# Patient Record
Sex: MALE | Race: WHITE | NOT HISPANIC OR LATINO
[De-identification: names, ages, dates, MRNs, and addresses within clinical notes are randomized per-mention and may not be internally consistent; named-entity substitution may affect disease eponyms.]

---

## 2018-08-13 PROBLEM — Z00.00 ENCOUNTER FOR PREVENTIVE HEALTH EXAMINATION: Status: ACTIVE | Noted: 2018-08-13

## 2018-09-21 ENCOUNTER — APPOINTMENT (OUTPATIENT)
Dept: COLORECTAL SURGERY | Facility: CLINIC | Age: 28
End: 2018-09-21
Payer: COMMERCIAL

## 2018-09-21 VITALS
HEART RATE: 81 BPM | HEIGHT: 65 IN | WEIGHT: 134 LBS | BODY MASS INDEX: 22.33 KG/M2 | SYSTOLIC BLOOD PRESSURE: 107 MMHG | TEMPERATURE: 98.2 F | DIASTOLIC BLOOD PRESSURE: 73 MMHG

## 2018-09-21 DIAGNOSIS — Z80.0 FAMILY HISTORY OF MALIGNANT NEOPLASM OF DIGESTIVE ORGANS: ICD-10-CM

## 2018-09-21 DIAGNOSIS — K64.8 OTHER HEMORRHOIDS: ICD-10-CM

## 2018-09-21 DIAGNOSIS — Z80.3 FAMILY HISTORY OF MALIGNANT NEOPLASM OF BREAST: ICD-10-CM

## 2018-09-21 DIAGNOSIS — Z87.891 PERSONAL HISTORY OF NICOTINE DEPENDENCE: ICD-10-CM

## 2018-09-21 DIAGNOSIS — Z83.71 FAMILY HISTORY OF COLONIC POLYPS: ICD-10-CM

## 2018-09-21 PROCEDURE — 99202 OFFICE O/P NEW SF 15 MIN: CPT | Mod: 25

## 2018-09-21 PROCEDURE — 46600 DIAGNOSTIC ANOSCOPY SPX: CPT

## 2020-01-31 ENCOUNTER — EMERGENCY (EMERGENCY)
Facility: HOSPITAL | Age: 30
LOS: 1 days | Discharge: HOME | End: 2020-01-31
Attending: EMERGENCY MEDICINE | Admitting: EMERGENCY MEDICINE
Payer: COMMERCIAL

## 2020-01-31 VITALS
SYSTOLIC BLOOD PRESSURE: 123 MMHG | WEIGHT: 134.92 LBS | OXYGEN SATURATION: 98 % | TEMPERATURE: 98 F | DIASTOLIC BLOOD PRESSURE: 76 MMHG | RESPIRATION RATE: 18 BRPM | HEART RATE: 88 BPM | HEIGHT: 66 IN

## 2020-01-31 VITALS
SYSTOLIC BLOOD PRESSURE: 124 MMHG | HEART RATE: 85 BPM | OXYGEN SATURATION: 98 % | TEMPERATURE: 98 F | RESPIRATION RATE: 18 BRPM | DIASTOLIC BLOOD PRESSURE: 68 MMHG
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
## 2020-01-31 DIAGNOSIS — S09.90XA UNSPECIFIED INJURY OF HEAD, INITIAL ENCOUNTER: ICD-10-CM

## 2020-01-31 DIAGNOSIS — S06.0X0A CONCUSSION WITHOUT LOSS OF CONSCIOUSNESS, INITIAL ENCOUNTER: ICD-10-CM
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
## 2020-01-31 PROCEDURE — 99284 EMERGENCY DEPT VISIT MOD MDM: CPT

## 2020-01-31 PROCEDURE — 99283 EMERGENCY DEPT VISIT LOW MDM: CPT

## 2020-01-31 PROCEDURE — 99284 EMERGENCY DEPT VISIT MOD MDM: CPT | Mod: 25

## 2020-01-31 PROCEDURE — 70450 CT HEAD/BRAIN W/O DYE: CPT | Mod: 26

## 2020-01-31 PROCEDURE — 70450 CT HEAD/BRAIN W/O DYE: CPT

## 2020-01-31 NOTE — ED PROVIDER NOTE - NSFOLLOWUPINSTRUCTIONS_ED_ALL_ED_FT
Do not go snowboarding this year    Concussion, Adult     A concussion is a brain injury from a hard, direct hit (trauma) to the head or body. This direct hit causes the brain to shake quickly back and forth inside the skull. This can damage brain cells and cause chemical changes in the brain. A concussion may also be known as a mild traumatic brain injury (TBI).  Concussions are usually not life-threatening, but the effects of a concussion can be serious. If you have a concussion, you should be very careful to avoid having a second concussion.  What are the causes?  This condition is caused by:  A direct hit to your head, such as:  Running into another player during a game.Being hit in a fight.Hitting your head on a hard surface.Sudden movement of your body that causes your brain to move back and forth inside the skull, such as in a car crash.What are the signs or symptoms?  The signs of a concussion can be hard to notice. Early on, they may be missed by you, family members, and health care providers. You may look fine on the outside but may act or feel differently.  Symptoms are usually temporary and most often improve in 7–10 days. Some symptoms appear right away, but other symptoms may not show up for hours or days. If your symptoms last longer than normal, you may have post-concussion syndrome. Every head injury is different.  Physical symptoms     Headaches. This can include a feeling of pressure in the head or migraine-like symptoms.Tiredness (fatigue).Dizziness.Problems with coordination or balance.Vision or hearing problems.Sensitivity to light or noise.Nausea or vomiting.Changes in eating or sleeping patterns.Numbness or tingling.Seizure.Mental and emotional symptoms     Memory problems.Trouble concentrating, organizing, or making decisions.Slowness in thinking, acting or reacting, speaking, or reading.Irritability or mood changes.Anxiety or depression.How is this diagnosed?  This condition is diagnosed based on:  Your symptoms.A description of your injury.You may also have tests, including:  Imaging tests, such as a CT scan or MRI.Neuropsychological tests. These measure your thinking, understanding, learning, and remembering abilities.How is this treated?  Treatment for this condition includes:  Stopping sports or activity if you are injured. If you hit your head or show signs of concussion:   Do not return to sports or activities the same day. Get checked by a health care provider before you return to your activities.Physical and mental rest and careful observation, usually at home. Gradually return to your normal activities.Medicines to help with symptoms such as headaches, nausea, or difficulty sleeping.  Avoid taking opioid pain medicine while recovering from a concussion.Avoiding alcohol and drugs. These may slow your recovery and can put you at risk of further injury.Referral to a concussion clinic or rehabilitation center.Recovery from a concussion can take time. How fast you recover depends on many factors. Return to activities only when:  Your symptoms are completely gone.Your health care provider says that it is safe.Follow these instructions at home:  Activity     Limit activities that require a lot of thought or concentration, such as:  Doing homework or job-related work.Watching TV.Working on the computer or phone.Playing memory games and puzzles.Rest. Rest helps your brain heal. Make sure you:  Get plenty of sleep. Most adults should get 7–9 hours of sleep each night.Rest during the day. Take naps or rest breaks when you feel tired.Avoid physical activity like exercise until your health care provider says it is safe. Stop any activity that worsens symptoms.Do not do high-risk activities that could cause a second concussion, such as riding a bike or playing sports.Ask your health care provider when you can return to your normal activities, such as school, work, athletics, and driving. Your ability to react may be slower after a brain injury. Never do these activities if you are dizzy. Your health care provider will likely give you a plan for gradually returning to activities.General instructions        Take over-the-counter and prescription medicines only as told by your health care provider. Some medicines, such as blood thinners (anticoagulants) and aspirin, may increase the risk for complications, such as bleeding.Do not drink alcohol until your health care provider says you can.Watch your symptoms and tell others around you to do the same. Complications sometimes occur after a concussion. Older adults with a brain injury may have a higher risk of serious complications.Tell your , teachers, school nurse, school counselor, , or  about your injury, symptoms, and restrictions.Keep all follow-up visits as told by your health care provider. This is important.How is this prevented?  Avoiding another brain injury is very important. In rare cases, another injury can lead to permanent brain damage, brain swelling, or death. The risk of this is greatest during the first 7–10 days after a head injury. Avoid injuries by:  Stopping activities that could lead to a second concussion, such as contact or recreational sports, until your health care provider says it is okay.Taking these actions once you have returned to sports or activities:  Avoiding plays or moves that can cause you to crash into another person. This is how most concussions occur.Following the rules and being respectful of other players. Do not engage in violent or illegal plays.Getting regular exercise that includes strength and balance training.Wearing a properly fitting helmet during sports, biking, or other activities. Helmets can help protect you from serious skull and brain injuries, but they do not protect you from a concussion. Even when wearing a helmet, you should avoid being hit in the head.Contact a health care provider if:  Your symptoms get worse or they do not improve.You have new symptoms.You have another injury.Get help right away if:  You have severe or worsening headaches.You have weakness or numbness in any part of your body.You are confused.Your coordination gets worse.You vomit repeatedly.You are sleepier than normal.Your speech is slurred.You cannot recognize people or places.You have a seizure.It is difficult to wake you up.You have unusual behavior changes.You have changes in your vision.You lose consciousness.Summary  A concussion is a brain injury that results from a hard, direct hit (trauma) to your head or body.You may have imaging tests and neuropsychological tests to diagnose a concussion.Treatment for this condition includes physical and mental rest and careful observation.Ask your health care provider when you can return to your normal activities, such as school, work, athletics, and driving. Get help right away if you have a severe headache, weakness on one side of the body, seizures, behavior changes, changes in vision, or if you are confused or sleepier than normal.This information is not intended to replace advice given to you by your health care provider. Make sure you discuss any questions you have with your health care provider.    Document Released: 03/09/2005 Document Revised: 08/08/2019 Document Reviewed: 08/08/2019  [employer] Interactive Patient Education © 2019 [employer]

## 2020-01-31 NOTE — ED ADULT NURSE NOTE - OBJECTIVE STATEMENT
Pt is a 28 y/o M, arrived to ED with c/o headache , forgetfulness , trouble concentrating, exhaustion and difficulty sleeping since Tuesday after he hit his head on the metal bar in his closet. Hx of prior concussion secondary to sports injury. Denies any loc. Denies any nausea , vomiting nor blurry vision. No ataxia or trouble with ambulation noted. FRANCISCO on exam. Was sent by Dr. Crockett for evaluation.

## 2020-01-31 NOTE — ED PROVIDER NOTE - CARE PROVIDER_API CALL
Dennys Crockett)  Electrodiagnostic Medicine; Neurology  [address] [id number]  Phone: [phone]  Fax: [phone]  Follow Up Time:

## 2020-01-31 NOTE — ED PROVIDER NOTE - OBJECTIVE STATEMENT
29 m co head injury- 3 days ago- was struck on the back of the head by a metal bar - fell from his closest- bar weighs about 5 pounds- struck him on the back of his head- no loc no n/v- since the incident- he has had trouble focusing at work, felt unwell while trying to work- took the day off yesterday and felt much better- but when he tries to focus- feels off, trouble concentrating- has never been dx w concussion in the past- but had an incident snowboarding when he hit his head and lost consciousness  and a second question snowboarding incident in the past  no ac no other med problems  mild-moderate severity

## 2020-01-31 NOTE — ED ADULT NURSE NOTE - CHPI ED NUR SYMPTOMS NEG
no loss of consciousness/no weakness/no vomiting/no nausea/no numbness/no change in level of consciousness/no blurred vision/no confusion/no dizziness/no fever

## 2020-01-31 NOTE — ED PROVIDER NOTE - CLINICAL SUMMARY MEDICAL DECISION MAKING FREE TEXT BOX
mild head injury w concussion like sx- d/w Dr Crockett- he will come down and eval px mild head injury w concussion like sx- d/w Dr Crockett- he will come down and eval px  CT head neg- seen by neuro- ok to go

## 2020-01-31 NOTE — ED PROVIDER NOTE - PATIENT PORTAL LINK FT
You can access the FollowMyHealth Patient Portal offered by [facility] by registering at the following website: [email or web address]. By joining [facility]’s FollowMyHealth portal, you will also be able to view your health information using other applications (apps) compatible with our system.

## 2020-01-31 NOTE — CONSULT NOTE ADULT - SUBJECTIVE AND OBJECTIVE BOX
Patient is a 29y old  Male who presents with a chief complaint of head trauma - with dizziness lightheaded - no new tremors       HPI:      Allergies  dairy products (Other)  No Known Drug Allergies      Health Issues  MEWS Score  Traumatic injury of head, initial encounter  HEAD INJURY  90+  Concussion without loss of consciousness, initial encounter        FAMILY HISTORY:      MEDICATIONS  (STANDING):    MEDICATIONS  (PRN):      PAST MEDICAL & SURGICAL HISTORY:      Labs              Radiology:    Physical Exam    MENTAL STATUS  -Level of Consciousness- awake    Orientation- person, place time  Language- aphasia/ dysarthria- nl  Memory- recent and remote- nl      Cranial Nerve 1- 12  Pupils- equal and reactive  Eye movements-nl  Facial - no asymmetry   Lower CN-nl    Gait and Station- nl    MOTOR  Upper- nl  Lower-nl    Reflexes-nl    Sensation- nl    Cerebellar-nl    vascular -nl    Assessment- Head trauma     Plan CT head - normal - FU as OP

## 2020-01-31 NOTE — ED PROVIDER NOTE - CARE PLAN
Principal Discharge DX:	Traumatic injury of head, initial encounter  Secondary Diagnosis:	Concussion without loss of consciousness, initial encounter

## 2020-01-31 NOTE — ED ADULT TRIAGE NOTE - CHIEF COMPLAINT QUOTE
Patient c/o headache , forgetfulness , exhaustion and difficulty sleeping since tuesday after he hit his head on the metal bar of his closet . Denies any loc . Denies any nausea , vomiting nor blurry vision . Was sent by Dr. Crockett for evaluation .

## 2020-10-09 ENCOUNTER — APPOINTMENT (OUTPATIENT)
Dept: ORTHOPEDIC SURGERY | Facility: CLINIC | Age: 30
End: 2020-10-09
Payer: COMMERCIAL

## 2020-10-09 ENCOUNTER — APPOINTMENT (OUTPATIENT)
Dept: ORTHOPEDIC SURGERY | Facility: CLINIC | Age: 30
End: 2020-10-09

## 2020-10-09 DIAGNOSIS — M23.91 UNSPECIFIED INTERNAL DERANGEMENT OF RIGHT KNEE: ICD-10-CM

## 2020-10-09 DIAGNOSIS — M25.561 PAIN IN RIGHT KNEE: ICD-10-CM

## 2020-10-09 PROCEDURE — 99203 OFFICE O/P NEW LOW 30 MIN: CPT

## 2020-10-09 RX ORDER — MELOXICAM 7.5 MG/1
7.5 TABLET ORAL DAILY
Qty: 60 | Refills: 0 | Status: ACTIVE | COMMUNITY
Start: 2020-10-09 | End: 1900-01-01

## 2020-10-09 NOTE — HISTORY OF PRESENT ILLNESS
[Home] : at home, [unfilled] , at the time of the visit. [Medical Office: ([facility])___] : at the medical office located in  [Verbal consent obtained from patient] : the patient, [unfilled] [de-identified] : 31 yo patient here c/o intermittent, sharp, 5/10, diffuse right knee pain that is worse with activity. He reports rest, ice all make his better; sit to stand, stairs and tennis all make his pain worse. Verbal Consent was given to proceed with telehealth visit by patient prior to the start of the telehealth visit.\par \par \par No Current Medication; home exercises; No recent injection

## 2020-10-09 NOTE — ASSESSMENT
[FreeTextEntry1] : Assessment/Plan:\par \par \par 29 yo here with right knee pain related to tennis and strenuous activity \par \par Plan:\par \par 1) Prescribed MRI of Right knee, fu with results\par \par \par

## 2022-09-21 ENCOUNTER — NON-APPOINTMENT (OUTPATIENT)
Age: 32
End: 2022-09-21

## 2024-02-08 NOTE — ED PROVIDER NOTE - CCCP TRG CHIEF CMPLNT

## 2024-04-12 ENCOUNTER — NON-APPOINTMENT (OUTPATIENT)
Age: 34
End: 2024-04-12